# Patient Record
Sex: MALE | Race: WHITE | URBAN - NONMETROPOLITAN AREA
[De-identification: names, ages, dates, MRNs, and addresses within clinical notes are randomized per-mention and may not be internally consistent; named-entity substitution may affect disease eponyms.]

---

## 2019-07-18 ENCOUNTER — OFFICE VISIT (OUTPATIENT)
Dept: PRIMARY CARE CLINIC | Age: 64
End: 2019-07-18
Payer: MEDICARE

## 2019-07-18 VITALS
TEMPERATURE: 98.8 F | HEART RATE: 89 BPM | WEIGHT: 222.6 LBS | BODY MASS INDEX: 29.5 KG/M2 | OXYGEN SATURATION: 95 % | HEIGHT: 73 IN | SYSTOLIC BLOOD PRESSURE: 157 MMHG | DIASTOLIC BLOOD PRESSURE: 87 MMHG

## 2019-07-18 DIAGNOSIS — J01.00 ACUTE NON-RECURRENT MAXILLARY SINUSITIS: Primary | ICD-10-CM

## 2019-07-18 PROCEDURE — G8427 DOCREV CUR MEDS BY ELIG CLIN: HCPCS | Performed by: NURSE PRACTITIONER

## 2019-07-18 PROCEDURE — 4004F PT TOBACCO SCREEN RCVD TLK: CPT | Performed by: NURSE PRACTITIONER

## 2019-07-18 PROCEDURE — 3017F COLORECTAL CA SCREEN DOC REV: CPT | Performed by: NURSE PRACTITIONER

## 2019-07-18 PROCEDURE — G8419 CALC BMI OUT NRM PARAM NOF/U: HCPCS | Performed by: NURSE PRACTITIONER

## 2019-07-18 PROCEDURE — 99202 OFFICE O/P NEW SF 15 MIN: CPT | Performed by: NURSE PRACTITIONER

## 2019-07-18 RX ORDER — LISINOPRIL 40 MG/1
TABLET ORAL
Refills: 1 | COMMUNITY
Start: 2019-05-20

## 2019-07-18 RX ORDER — PANTOPRAZOLE SODIUM 40 MG/1
TABLET, DELAYED RELEASE ORAL
Refills: 1 | COMMUNITY
Start: 2019-05-20

## 2019-07-18 RX ORDER — CALCIUM CITRATE/VITAMIN D3 200MG-6.25
TABLET ORAL
Refills: 5 | COMMUNITY
Start: 2019-06-21

## 2019-07-18 RX ORDER — HYDROCHLOROTHIAZIDE 25 MG/1
TABLET ORAL
Refills: 1 | COMMUNITY
Start: 2019-04-10

## 2019-07-18 RX ORDER — BUPROPION HYDROCHLORIDE 300 MG/1
TABLET ORAL
Refills: 1 | COMMUNITY
Start: 2019-05-07

## 2019-07-18 RX ORDER — METOPROLOL TARTRATE 100 MG/1
TABLET ORAL
Refills: 1 | COMMUNITY
Start: 2019-06-25

## 2019-07-18 RX ORDER — AMOXICILLIN AND CLAVULANATE POTASSIUM 875; 125 MG/1; MG/1
1 TABLET, FILM COATED ORAL 2 TIMES DAILY
Qty: 20 TABLET | Refills: 0 | Status: SHIPPED | OUTPATIENT
Start: 2019-07-18 | End: 2019-07-28

## 2019-07-18 RX ORDER — SITAGLIPTIN 100 MG/1
TABLET, FILM COATED ORAL
Refills: 1 | COMMUNITY
Start: 2019-06-25

## 2019-07-18 RX ORDER — FLUTICASONE PROPIONATE 50 MCG
SPRAY, SUSPENSION (ML) NASAL
Qty: 1 BOTTLE | Refills: 0 | Status: SHIPPED | OUTPATIENT
Start: 2019-07-18

## 2019-07-18 ASSESSMENT — ENCOUNTER SYMPTOMS
COUGH: 1
SORE THROAT: 0
RHINORRHEA: 1
NAUSEA: 0
DIARRHEA: 0
SINUS PAIN: 1
SHORTNESS OF BREATH: 0
WHEEZING: 0
SINUS PRESSURE: 1
VOMITING: 0

## 2019-07-18 ASSESSMENT — PATIENT HEALTH QUESTIONNAIRE - PHQ9
SUM OF ALL RESPONSES TO PHQ QUESTIONS 1-9: 2
1. LITTLE INTEREST OR PLEASURE IN DOING THINGS: 1
SUM OF ALL RESPONSES TO PHQ QUESTIONS 1-9: 2
2. FEELING DOWN, DEPRESSED OR HOPELESS: 1
SUM OF ALL RESPONSES TO PHQ9 QUESTIONS 1 & 2: 2

## 2019-07-18 NOTE — PATIENT INSTRUCTIONS
SURVEY:    You may be receiving a survey from [employer] regarding your visit today. Please complete the survey to enable us to provide the highest quality of care to you and your family. If you cannot score us a very good on any question, please call the office to discuss how we could of made your experience a very good one. Thank you. Patient Education        Sinusitis: Care Instructions  Your Care Instructions    Sinusitis is an infection of the lining of the sinus cavities in your head. Sinusitis often follows a cold. It causes pain and pressure in your head and face. In most cases, sinusitis gets better on its own in 1 to 2 weeks. But some mild symptoms may last for several weeks. Sometimes antibiotics are needed. Follow-up care is a key part of your treatment and safety. Be sure to make and go to all appointments, and call your doctor if you are having problems. It's also a good idea to know your test results and keep a list of the medicines you take. How can you care for yourself at home? · Take an over-the-counter pain medicine, such as acetaminophen (Tylenol), ibuprofen (Advil, Motrin), or naproxen (Aleve). Read and follow all instructions on the label. · If the doctor prescribed antibiotics, take them as directed. Do not stop taking them just because you feel better. You need to take the full course of antibiotics. · Be careful when taking over-the-counter cold or flu medicines and Tylenol at the same time. Many of these medicines have acetaminophen, which is Tylenol. Read the labels to make sure that you are not taking more than the recommended dose. Too much acetaminophen (Tylenol) can be harmful. · Breathe warm, moist air from a steamy shower, a hot bath, or a sink filled with hot water. Avoid cold, dry air. Using a humidifier in your home may help. Follow the directions for cleaning the machine.   · Use saline (saltwater) nasal washes to help keep your nasal passages open and wash out mucus and bacteria. You can buy saline nose drops at a grocery store or drugstore. Or you can make your own at home by adding 1 teaspoon of salt and 1 teaspoon of baking soda to 2 cups of distilled water. If you make your own, fill a bulb syringe with the solution, insert the tip into your nostril, and squeeze gently. [place] your nose. · Put a hot, wet towel or a warm gel pack on your face 3 or 4 times a day for 5 to 10 minutes each time. · Try a decongestant nasal spray like oxymetazoline (Afrin). Do not use it for more than 3 days in a row. Using it for more than 3 days can make your congestion worse. When should you call for help? Call your doctor now or seek immediate medical care if:    · You have new or worse swelling or redness in your face or around your eyes.     · You have a new or higher fever.    Watch closely for changes in your health, and be sure to contact your doctor if:    · You have new or worse facial pain.     · The mucus from your nose becomes thicker (like pus) or has new blood in it.     · You are not getting better as expected. Where can you learn more? Go to https://Optimal+.KOEZY. org and sign in to your [employer] account. Enter N393 in the [facility] box to learn more about \"Sinusitis: Care Instructions. \"     If you do not have an account, please click on the \"Sign Up Now\" link. Current as of: October 21, 2018  Content Version: 12.0  © 5067-2089 Healthwise, Incorporated. Care instructions adapted under license by [facility] ([facility]). If you have questions about a medical condition or this instruction, always ask your healthcare professional. [place] [address] any warranty or liability for your use of this information.   Patient Education        amoxicillin and clavulanate potassium  Pronunciation:  am OK i DEAN in [address] [place] T[place] ee um  Brand:  Augmentin, Augmentin ES-600, Augmentin XR  What is the most important information I should know current medicines and any you start or stop using, especially:  · allopurinol;  · probenecid; or  · a blood thinner --warfarin, Coumadin, Jantoven. This list is not complete. Other drugs may interact with amoxicillin and clavulanate potassium, including prescription and over-the-counter medicines, vitamins, and herbal products. Not all possible interactions are listed in this medication guide. Where can I get more information? Your pharmacist can provide more information about amoxicillin and clavulanate potassium. Remember, keep this and all other medicines out of the reach of children, never share your medicines with others, and use this medication only for the indication prescribed. Every effort has been made to ensure that the information provided by [facility] Aditi Haley is accurate, up-to-date, and complete, but no guarantee is made to that effect. Drug information contained herein may be time sensitive. [facility] information has been compiled for use by healthcare practitioners and consumers in the [place] and therefore [facility] does not warrant that uses outside of the [place] are appropriate, unless specifically indicated otherwise. [facility] drug information does not endorse drugs, diagnose patients or recommend therapy. [facility] drug information is an informational resource designed to assist licensed healthcare practitioners in caring for their patients and/or to serve consumers viewing this service as a supplement to, and not a substitute for, the expertise, skill, knowledge and judgment of healthcare practitioners. The absence of a warning for a given drug or drug combination in no way should be construed to indicate that the drug or drug combination is safe, effective or appropriate for any given patient. [facility] does not assume any responsibility for any aspect of healthcare administered with the aid of information [facility] provides.  The information contained herein is not intended

## 2019-07-18 NOTE — PROGRESS NOTES
[address] [facility] WALK-IN CARE  [address] [facility] [address]  Dept: [phone]  Dept Fax: [phone]     Jessee Dumont is a 66 y.o. male who presents to the [facility] in Care today for hismedical conditions/complaints as noted below. Jessee Dumont is c/o of Facial Pain (Patient c/o sinus pain and pressure x 3-4 weeks. Bilateral ear pain )      HPI:     Sinusitis   This is a new problem. The current episode started 1 to 4 weeks ago (Started 3 to 4 weeks ago with productive cough of thick white, runny nose, nasal congestion with sinus pain and pressure. Reports having pain in upper teeth with B/L ear pain now and sinus pain is getting worse. ). The problem has been gradually worsening since onset. There has been no fever. His pain is at a severity of 7/10. The pain is moderate. Associated symptoms include congestion, coughing, diaphoresis, ear pain, headaches and sinus pressure. Pertinent negatives include no chills, shortness of breath or sore throat. Treatments tried: Ibuprofen. The treatment provided no relief.           Past Medical History:   Diagnosis Date    Anxiety     Diabetes mellitus ([facility] [address]     Hypertension         Current Outpatient Medications   Medication Sig Dispense Refill    buPROPion (WELLBUTRIN XL) 300 MG extended release tablet TAKE 1 TABLET BY MOUTH DAILY  1    TRUE METRIX BLOOD GLUCOSE TEST strip TEST BLOOD SUGAR TWICE DAILY  5    hydrochlorothiazide (HYDRODIURIL) 25 MG tablet TAKE 1 TABLET BY MOUTH EVERY DAY  1    lisinopril (PRINIVIL;ZESTRIL) 40 MG tablet TAKE 1 TABLET BY MOUTH DAILY  1    metFORMIN (GLUCOPHAGE) 1000 MG tablet TAKE 1 TABLET BY MOUTH TWICE DAILY  1    metoprolol (LOPRESSOR) 100 MG tablet TAKE 1 TABLET BY MOUTH EVERY DAY  1    pantoprazole (PROTONIX) 40 MG tablet TAKE 1 TABLET BY MOUTH EVERY MORNING  1    JANUVIA 100 MG tablet TAKE 1/2 (ONE-HALF) OF A TABLET BY MOUTH DAILY IN THE MORNING  1    amoxicillin-clavulanate (AUGMENTIN) 875-125 MG per tablet Take 1 tablet by mouth 2 times daily for 10 days 20 tablet 0    fluticasone (FLONASE) 50 MCG/ACT nasal spray One spray each nostril twice daily for 7 days. 1 Bottle 0     No current facility-administered medications for this visit. No Known Allergies    Subjective:     Review of Systems   Constitutional: Positive for diaphoresis. Negative for appetite change, chills, fatigue and fever. HENT: Positive for congestion, ear pain, rhinorrhea, sinus pressure and sinus pain. Negative for sore throat. Respiratory: Positive for cough. Negative for shortness of breath and wheezing. Gastrointestinal: Negative for diarrhea, nausea and vomiting. Skin: Negative for rash and wound. Neurological: Positive for dizziness, light-headedness and headaches. Objective:      Physical Exam   Constitutional: He is oriented to person, place, and time. He appears well-developed and well-nourished. He is cooperative. He does not appear ill. No distress. HENT:   Head: Normocephalic and atraumatic. Right Ear: Hearing, external ear and ear canal normal. No drainage. No mastoid tenderness. Tympanic membrane is not injected, not erythematous and not bulging. A middle ear effusion (creamy white fluid) is present. Left Ear: Hearing, external ear and ear canal normal. No drainage. No mastoid tenderness. Tympanic membrane is not injected, not erythematous and not bulging. A middle ear effusion (creamy white fluid) is present. Nose: Mucosal edema and rhinorrhea present. Right sinus exhibits maxillary sinus tenderness. Right sinus exhibits no frontal sinus tenderness. Left sinus exhibits maxillary sinus tenderness. Left sinus exhibits no frontal sinus tenderness. Mouth/Throat: Uvula is midline and mucous membranes are normal. Oropharyngeal exudate (thick creamy yellow secretions posterior pharynx) and posterior oropharyngeal erythema present. No posterior oropharyngeal edema.    Eyes: Pupils are days     Dispense:  20 tablet     Refill:  0    fluticasone (FLONASE) 50 MCG/ACT nasal spray     Sig: One spray each nostril twice daily for 7 days. Dispense:  1 Bottle     Refill:  0      · Encouraged to increase fluids and rest  · Continue antibiotic as prescribed until all doses are completed - take with food  · Probiotic OTC or greek yogurt daily while on antibiotic  · Mucinex/Guaifenesin OTC as directed on package  · Nasal saline spray OTC every couple of hours for nasal congestion  · Fluticasone nasal spray, 1 spray each nostril, twice a day for 7 to 10 days  · Warm facial packs applied to face for 5 to 10 minutes, 3 times per day  · Aleve/Ibuprofen/Tylenol OTC PRN for pain, discomfort or fever  · Patient instructions given for acute sinusitis and augmentin. · To ER or call 911 if any difficulty breathing, shortness of breath, inability to swallow, hives, rash, facial/tongue swelling or temp greater than 103 degrees. · Follow up as needed with PCP if symptoms worsen or do not improve     Jonnie received counseling on the following healthy behaviors: medication adherence. Patient given educational materials - see patient instructions. Discussed use,benefit, and side effects of prescribed medications. Treatment plan discussed atvisit. Continue routine health care follow up. All patient questions answered. Pt voiced understanding.       Electronically signed by LIVAN Delacruz CNP on 7/18/2019 at 11:26 AM

## 2022-12-22 ENCOUNTER — OFFICE VISIT (OUTPATIENT)
Dept: PRIMARY CARE CLINIC | Age: 67
End: 2022-12-22
Payer: MEDICARE

## 2022-12-22 VITALS
HEART RATE: 62 BPM | HEIGHT: 73 IN | WEIGHT: 200 LBS | RESPIRATION RATE: 18 BRPM | DIASTOLIC BLOOD PRESSURE: 64 MMHG | TEMPERATURE: 98 F | BODY MASS INDEX: 26.51 KG/M2 | OXYGEN SATURATION: 97 % | SYSTOLIC BLOOD PRESSURE: 128 MMHG

## 2022-12-22 DIAGNOSIS — J01.40 ACUTE NON-RECURRENT PANSINUSITIS: Primary | ICD-10-CM

## 2022-12-22 PROCEDURE — G8427 DOCREV CUR MEDS BY ELIG CLIN: HCPCS | Performed by: NURSE PRACTITIONER

## 2022-12-22 PROCEDURE — G8417 CALC BMI ABV UP PARAM F/U: HCPCS | Performed by: NURSE PRACTITIONER

## 2022-12-22 PROCEDURE — 4004F PT TOBACCO SCREEN RCVD TLK: CPT | Performed by: NURSE PRACTITIONER

## 2022-12-22 PROCEDURE — G8484 FLU IMMUNIZE NO ADMIN: HCPCS | Performed by: NURSE PRACTITIONER

## 2022-12-22 PROCEDURE — 1123F ACP DISCUSS/DSCN MKR DOCD: CPT | Performed by: NURSE PRACTITIONER

## 2022-12-22 PROCEDURE — 99213 OFFICE O/P EST LOW 20 MIN: CPT | Performed by: NURSE PRACTITIONER

## 2022-12-22 PROCEDURE — 3017F COLORECTAL CA SCREEN DOC REV: CPT | Performed by: NURSE PRACTITIONER

## 2022-12-22 RX ORDER — DOXYCYCLINE HYCLATE 100 MG
100 TABLET ORAL 2 TIMES DAILY
Qty: 14 TABLET | Refills: 0 | Status: SHIPPED | OUTPATIENT
Start: 2022-12-22 | End: 2022-12-29

## 2022-12-22 RX ORDER — DEXTROMETHORPHAN HYDROBROMIDE AND PROMETHAZINE HYDROCHLORIDE 15; 6.25 MG/5ML; MG/5ML
5 SYRUP ORAL 4 TIMES DAILY PRN
Qty: 120 ML | Refills: 0 | Status: SHIPPED | OUTPATIENT
Start: 2022-12-22 | End: 2022-12-29

## 2022-12-22 RX ORDER — ATORVASTATIN CALCIUM 80 MG/1
TABLET, FILM COATED ORAL
COMMUNITY
Start: 2022-11-29

## 2022-12-22 RX ORDER — LANCETS 28 GAUGE
EACH MISCELLANEOUS
COMMUNITY
Start: 2022-12-05

## 2022-12-22 ASSESSMENT — ENCOUNTER SYMPTOMS
RHINORRHEA: 0
SINUS PAIN: 1
SORE THROAT: 0
NAUSEA: 0
VOMITING: 0
DIARRHEA: 1
COUGH: 1
WHEEZING: 1
SHORTNESS OF BREATH: 0
SINUS PRESSURE: 1

## 2022-12-22 NOTE — PATIENT INSTRUCTIONS
SURVEY:    You may be receiving a survey from [employer] regarding your visit today. Please complete the survey to enable us to provide the highest quality of care to you and your family. If you cannot score us a very good on any question, please call the office to discuss how we could of made your experience a very good one. Thank you for letting us take care of you today. We hope all your questions were addressed. If a question was overlooked or something else comes to mind after you return home, please contact a member of your Care Team listed below. Thank you,  Pascual Dietrich MA      Your Care Team at [address]  Provider- PAXTON Ramos  Provider- PAXTON Delong  [address], [address] [facility]  Reception- Rolando Ybarra, [address] [facility]      Walk-in contact numbers:       Phone: [phone]                 Fax: [phone]    Dorothy Alpers Hours:  Mon-Thurs: 9:00 am - 5:30 pm     Friday: 8:00 am - 12:00 pm           Sat-Sun: CLOSED    Encouraged to increase fluids and rest  Continue antibiotic as prescribed until all doses are completed - take with food  Probiotic OTC or greek yogurt daily while on antibiotic  Promethazine DM as prescribed every 6 hours as needed for cough. Nasal saline spray OTC every couple of hours for nasal congestion  Fluticasone nasal spray, 1 spray each nostril, twice a day for 7 to 10 days  Warm facial packs applied to face for 5 to 10 minutes, 3 times per day  Ibuprofen/Tylenol OTC PRN for pain, discomfort or fever  Patient instructions given for acute sinusitis, promethazine dm and doxycycline  To ER or call 911 if any difficulty breathing, shortness of breath, inability to swallow, hives, rash, facial/tongue swelling or temp greater than 103 degrees.   Follow up as needed with PCP if symptoms worsen or do not improve

## 2022-12-22 NOTE — PROGRESS NOTES
[address] [facility] WALK-IN CARE  [address] [place] [id number]  Dept: [phone]  Dept Fax: [phone]    Scooter Wright is a 74 y.o. male who presents to the [facility] in Care today for hismedical conditions/complaints as noted below. Scooter Wright is c/o of URI (Started 2 months ago-Sinus pressure and pain, all over face, teeth hurt, post nasal drip)      HPI:     URI   This is a new problem. The current episode started more than 1 month ago. The problem has been gradually worsening. There has been no fever. Associated symptoms include congestion, coughing (Productive cough), diarrhea, sinus pain and wheezing. Pertinent negatives include no ear pain, headaches, nausea, rash, rhinorrhea, sore throat or vomiting. Associated symptoms comments: Postnasal drip and upper teeth hurt. Diarrhea started yesterday. Michel Lott He has tried NSAIDs (ibuprofen) for the symptoms. The treatment provided mild relief. Past Medical History:   Diagnosis Date    Anxiety     Diabetes mellitus (HCC)     Hypertension         Current Outpatient Medications   Medication Sig Dispense Refill    atorvastatin (LIPITOR) 80 MG tablet TAKE 1 TABLET BY MOUTH EVERY EVENING      Drug [place] Unilet Lancets 28G MISC use to test daily and as needed.       doxycycline hyclate (VIBRA-TABS) 100 MG tablet Take 1 tablet by mouth 2 times daily for 7 days 14 tablet 0    promethazine-dextromethorphan (PROMETHAZINE-DM) 6.25-15 MG/5ML syrup Take 5 mLs by mouth 4 times daily as needed for Cough 120 mL 0    buPROPion (WELLBUTRIN XL) 300 MG extended release tablet TAKE 1 TABLET BY MOUTH DAILY  1    TRUE METRIX BLOOD GLUCOSE TEST strip TEST BLOOD SUGAR TWICE DAILY  5    hydrochlorothiazide (HYDRODIURIL) 25 MG tablet TAKE 1 TABLET BY MOUTH EVERY DAY  1    lisinopril (PRINIVIL;ZESTRIL) 40 MG tablet TAKE 1 TABLET BY MOUTH DAILY  1    metFORMIN (GLUCOPHAGE) 1000 MG tablet TAKE 1 TABLET BY MOUTH TWICE DAILY  1 metoprolol (LOPRESSOR) 100 MG tablet TAKE 1 TABLET BY MOUTH EVERY DAY  1    pantoprazole (PROTONIX) 40 MG tablet TAKE 1 TABLET BY MOUTH EVERY MORNING  1    JANUVIA 100 MG tablet TAKE 1/2 (ONE-HALF) OF A TABLET BY MOUTH DAILY IN THE MORNING  1    fluticasone (FLONASE) 50 MCG/ACT nasal spray One spray each nostril twice daily for 7 days. 1 Bottle 0     No current facility-administered medications for this visit. No Known Allergies    :     Review of Systems   Constitutional:  Negative for appetite change, chills, diaphoresis, fatigue and fever. HENT:  Positive for congestion, postnasal drip, sinus pressure and sinus pain. Negative for ear pain, rhinorrhea and sore throat. Respiratory:  Positive for cough (Productive cough) and wheezing. Negative for shortness of breath. Gastrointestinal:  Positive for diarrhea. Negative for nausea and vomiting. Skin:  Negative for rash and wound. Neurological:  Negative for dizziness and headaches.     :     Physical Exam  Vitals and nursing note reviewed. Constitutional:       General: He is not in acute distress. Appearance: Normal appearance. He is well-developed. He is not ill-appearing or diaphoretic. Comments: Well hydrated, nontoxic appearance. HENT:      Head: Normocephalic and atraumatic. Right Ear: Hearing, ear canal and external ear normal. No drainage. A middle ear effusion (Opaque white fluid.) is present. No mastoid tenderness. Tympanic membrane is not injected, erythematous or bulging. Left Ear: Hearing, ear canal and external ear normal. No drainage. A middle ear effusion (Opaque white fluid.) is present. No mastoid tenderness. Tympanic membrane is not injected, erythematous or bulging. Nose: Mucosal edema, congestion and rhinorrhea present. Rhinorrhea is purulent. Right Sinus: Maxillary sinus tenderness and frontal sinus tenderness present.       Left Sinus: Maxillary sinus tenderness and frontal sinus tenderness present. Mouth/Throat:      Lips: Pink. Mouth: Mucous membranes are moist.      Pharynx: Uvula midline. Oropharyngeal exudate (Large amount of thick yellow secretions to posterior pharynx.) and posterior oropharyngeal erythema (Slight erythema to posterior pharynx.) present. No pharyngeal swelling or uvula swelling. Eyes:      General:         Right eye: No discharge. Left eye: No discharge. Conjunctiva/sclera: Conjunctivae normal.      Pupils: Pupils are equal, round, and reactive to light. Cardiovascular:      Rate and Rhythm: Normal rate and regular rhythm. Heart sounds: Normal heart sounds, S1 normal and S2 normal. No murmur heard. No friction rub. No gallop. Pulmonary:      Effort: Pulmonary effort is normal. No accessory muscle usage or respiratory distress. Breath sounds: Normal breath sounds and air entry. No decreased breath sounds, wheezing, rhonchi or rales. Comments: Occasional moist cough. Breath sounds clear B/L anterior and posterior lobes. Chest expansion symmetrical.  No audible wheezing or respiratory distress. No rales or rhonchi. Musculoskeletal:         General: Normal range of motion. Lymphadenopathy:      Cervical: No cervical adenopathy. Right cervical: No superficial or posterior cervical adenopathy. Left cervical: No superficial or posterior cervical adenopathy. Skin:     General: Skin is warm and dry. Coloration: Skin is not pale. Findings: No erythema or rash. Neurological:      Mental Status: He is alert and oriented to person, place, and time. Psychiatric:         Behavior: Behavior normal. Behavior is cooperative. /64 (Site: Left Upper Arm, Position: Sitting, Cuff Size: Medium Adult)   Pulse 62   Temp 98 °F (36.7 °C) (Oral)   Resp 18   Ht 6' 1\" (1.854 m)   Wt 200 lb (90.7 kg)   SpO2 97%   BMI 26.39 kg/m²         :      Diagnosis Orders   1.  Acute non-recurrent pansinusitis  doxycycline hyclate (VIBRA-TABS) 100 MG tablet    promethazine-dextromethorphan (PROMETHAZINE-DM) 6.25-15 MG/5ML syrup          :      Return if symptoms worsen or fail to improve, for Resume all previous medications as directed. Orders Placed This Encounter   Medications    doxycycline hyclate (VIBRA-TABS) 100 MG tablet     Sig: Take 1 tablet by mouth 2 times daily for 7 days     Dispense:  14 tablet     Refill:  0    promethazine-dextromethorphan (PROMETHAZINE-DM) 6.25-15 MG/5ML syrup     Sig: Take 5 mLs by mouth 4 times daily as needed for Cough     Dispense:  120 mL     Refill:  0      Encouraged to increase fluids and rest  Continue antibiotic as prescribed until all doses are completed - take with food  Probiotic OTC or greek yogurt daily while on antibiotic  Promethazine DM as prescribed every 6 hours as needed for cough. Nasal saline spray OTC every couple of hours for nasal congestion  Fluticasone nasal spray, 1 spray each nostril, twice a day for 7 to 10 days  Warm facial packs applied to face for 5 to 10 minutes, 3 times per day  Ibuprofen/Tylenol OTC PRN for pain, discomfort or fever  Patient instructions given for acute sinusitis, promethazine dm and doxycycline  To ER or call 911 if any difficulty breathing, shortness of breath, inability to swallow, hives, rash, facial/tongue swelling or temp greater than 103 degrees. Follow up as needed with PCP if symptoms worsen or do not improve      Vera Sommers received counseling on the following healthy behaviors: medication adherence, increased fluids and rest.  Patient given educational materials - see patient instructions. Discussed use, benefit, and side effects of prescribed medications. Treatment plan discussed at visit. Continue routine health care follow up. All patient questions answered. Pt voiced understanding.       Electronically signed by LIVAN Blake CNP on 12/22/2022 at 5:52 PM